# Patient Record
Sex: MALE | Race: BLACK OR AFRICAN AMERICAN | NOT HISPANIC OR LATINO | URBAN - METROPOLITAN AREA
[De-identification: names, ages, dates, MRNs, and addresses within clinical notes are randomized per-mention and may not be internally consistent; named-entity substitution may affect disease eponyms.]

---

## 2022-08-15 ENCOUNTER — AMBULATORY SURGICAL CENTER (OUTPATIENT)
Dept: URBAN - METROPOLITAN AREA SURGERY 2 | Facility: SURGERY | Age: 67
End: 2022-08-15

## 2022-08-15 ENCOUNTER — OFFICE (OUTPATIENT)
Dept: URBAN - METROPOLITAN AREA PATHOLOGY 20 | Facility: PATHOLOGY | Age: 67
End: 2022-08-15

## 2022-08-15 DIAGNOSIS — D12.3 BENIGN NEOPLASM OF TRANSVERSE COLON: ICD-10-CM

## 2022-08-15 DIAGNOSIS — Z86.010 PERSONAL HISTORY OF COLONIC POLYPS: ICD-10-CM

## 2022-08-15 DIAGNOSIS — D12.2 BENIGN NEOPLASM OF ASCENDING COLON: ICD-10-CM

## 2022-08-15 PROBLEM — K63.5 POLYP OF COLON: Status: ACTIVE | Noted: 2022-08-15

## 2022-08-15 PROCEDURE — 45380 COLONOSCOPY AND BIOPSY: CPT | Performed by: INTERNAL MEDICINE

## 2025-04-30 ENCOUNTER — OFFICE (OUTPATIENT)
Dept: URBAN - METROPOLITAN AREA CLINIC 9 | Facility: CLINIC | Age: 70
End: 2025-04-30
Payer: MEDICARE

## 2025-04-30 VITALS
HEIGHT: 71 IN | OXYGEN SATURATION: 97 % | WEIGHT: 231 LBS | HEART RATE: 72 BPM | SYSTOLIC BLOOD PRESSURE: 125 MMHG | DIASTOLIC BLOOD PRESSURE: 78 MMHG

## 2025-04-30 DIAGNOSIS — K58.9 IRRITABLE BOWEL SYNDROME, UNSPECIFIED: ICD-10-CM

## 2025-04-30 DIAGNOSIS — R10.2 PELVIC AND PERINEAL PAIN: ICD-10-CM

## 2025-04-30 PROCEDURE — 99204 OFFICE O/P NEW MOD 45 MIN: CPT | Performed by: NURSE PRACTITIONER

## 2025-04-30 NOTE — SERVICEHPINOTES
Patient is a 69 year old male with a past medical history of anxiety, BPH, GERD, hypertension, hyperlipidemia and neuropathy, who presents for evaluation of abdominal pain. He has a suprapubic pain that feels as if its radiating posteriorly to the back or rectal area x4  months intermittently. He feels this pain is random and not exacerbated or improved by defecation, p.o. intake, movements or urination. This pain has a burning, numbness like sensation. He did see his urologist for this. He relays having blood work, UA. He believes he had some sort of imaging completed with his urologist but unclear on specifics of this. He then had his PCP review this work up who had nothing additional to add. He has issues with chronic constipation and has only every taking PRN laxatives for this. He has about 3 BM per week but always with straining. He was prescribed Linzess for this reason by his PCP but has not started. No overt GI bleeding, weight loss, diarrhea, fecal urgency, incontinence, saddle numbness, dysuria, nausea or vomiting. He has a history of reflux which is well controlled on a daily omeprazole regimen. No OAC, AP or NSAIDs. He does take a daily multivitamin but is unaware if this has iron supplementation. No history of cardiac stents, cardiac surgery, heart failure, liver disease or chronic renal disease. He denies prior abdominal surgeries. shorty Lynn had his last colonoscopy completed in 2022 revealing internal hemorrhoids, 3mm TA polyp in the ascending colon and 2mm TA polyp in the transverse colon. Recall recommended for 2029. Remote EGD in 2011 due to reflux, and revealing of a medium sized hiatal hernia, erythema and congestion in the antrum suggestive of gastropathy and normal duodenum. GEJ biopsy consistent with mild reflux esophagitis. Antral biopsy with reactive gastropathy, HP negative. Duodenal biopsies benign.shorty

## 2025-04-30 NOTE — SERVICENOTES
He has already seen 2 other providers regarding his pain, and will obtain their work- up for review. Check calprotectin and consider CT pelvis pending course. Given Rx for Linzess but yet to . Will provide samples for him to try, and he will  Rx if does well on this therapy. Recall colonoscopy in 2029.